# Patient Record
Sex: MALE | Race: WHITE
[De-identification: names, ages, dates, MRNs, and addresses within clinical notes are randomized per-mention and may not be internally consistent; named-entity substitution may affect disease eponyms.]

---

## 2019-01-14 ENCOUNTER — HOSPITAL ENCOUNTER (INPATIENT)
Dept: HOSPITAL 54 - ER | Age: 59
LOS: 3 days | Discharge: HOME | DRG: 773 | End: 2019-01-17
Attending: NURSE PRACTITIONER | Admitting: INTERNAL MEDICINE
Payer: MEDICAID

## 2019-01-14 VITALS — BODY MASS INDEX: 20.6 KG/M2 | WEIGHT: 147.19 LBS | HEIGHT: 71 IN

## 2019-01-14 DIAGNOSIS — F11.10: ICD-10-CM

## 2019-01-14 DIAGNOSIS — F10.129: Primary | ICD-10-CM

## 2019-01-14 DIAGNOSIS — K74.60: ICD-10-CM

## 2019-01-14 DIAGNOSIS — S30.0XXA: ICD-10-CM

## 2019-01-14 DIAGNOSIS — E87.70: ICD-10-CM

## 2019-01-14 DIAGNOSIS — S92.352A: ICD-10-CM

## 2019-01-14 DIAGNOSIS — S90.112A: ICD-10-CM

## 2019-01-14 DIAGNOSIS — B19.20: ICD-10-CM

## 2019-01-14 DIAGNOSIS — E44.1: ICD-10-CM

## 2019-01-14 DIAGNOSIS — E83.42: ICD-10-CM
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
## 2019-01-14 DIAGNOSIS — D69.59: ICD-10-CM

## 2019-01-14 DIAGNOSIS — S80.211A: ICD-10-CM

## 2019-01-14 DIAGNOSIS — E87.6: ICD-10-CM

## 2019-01-14 DIAGNOSIS — S80.212A: ICD-10-CM

## 2019-01-14 DIAGNOSIS — D61.818: ICD-10-CM

## 2019-01-14 DIAGNOSIS — E22.2: ICD-10-CM
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
## 2019-01-14 DIAGNOSIS — G89.29: ICD-10-CM

## 2019-01-14 DIAGNOSIS — J01.90: ICD-10-CM
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
## 2019-01-14 DIAGNOSIS — J44.1: ICD-10-CM

## 2019-01-14 LAB
ALBUMIN SERPL BCP-MCNC: 2.9 G/DL (ref 3.4–5)
ALP SERPL-CCNC: 113 U/L (ref 46–116)
ALT SERPL W P-5'-P-CCNC: 38 U/L (ref 12–78)
APAP SERPL-MCNC: 0 UG/ML (ref 10–30)
AST SERPL W P-5'-P-CCNC: 98 U/L (ref 15–37)
BASOPHILS # BLD AUTO: 0 /CMM (ref 0–0.2)
BASOPHILS NFR BLD AUTO: 0.3 % (ref 0–2)
BILIRUB DIRECT SERPL-MCNC: 0.5 MG/DL (ref 0–0.2)
BILIRUB SERPL-MCNC: 1 MG/DL (ref 0.2–1)
BUN SERPL-MCNC: 13 MG/DL (ref 7–18)
CALCIUM SERPL-MCNC: 8.8 MG/DL (ref 8.5–10.1)
CHLORIDE SERPL-SCNC: 94 MMOL/L (ref 98–107)
CO2 SERPL-SCNC: 27 MMOL/L (ref 21–32)
CREAT SERPL-MCNC: 0.8 MG/DL (ref 0.6–1.3)
EOSINOPHIL NFR BLD AUTO: 0.2 % (ref 0–6)
EOSINOPHIL NFR BLD MANUAL: 1 % (ref 0–4)
ETHANOL SERPL-MCNC: 337 MG/DL (ref 0–0)
GLUCOSE SERPL-MCNC: 115 MG/DL (ref 74–106)
HCT VFR BLD AUTO: 36 % (ref 39–51)
HGB BLD-MCNC: 12 G/DL (ref 13.5–17.5)
LYMPHOCYTES NFR BLD AUTO: 3.2 /CMM (ref 0.8–4.8)
LYMPHOCYTES NFR BLD AUTO: 34.2 % (ref 20–44)
LYMPHOCYTES NFR BLD MANUAL: 34 % (ref 16–48)
MCHC RBC AUTO-ENTMCNC: 33 G/DL (ref 31–36)
MCV RBC AUTO: 88 FL (ref 80–96)
MONOCYTES NFR BLD AUTO: 1.5 /CMM (ref 0.1–1.3)
MONOCYTES NFR BLD AUTO: 16 % (ref 2–12)
MONOCYTES NFR BLD MANUAL: 10 % (ref 0–11)
NEUTROPHILS # BLD AUTO: 4.6 /CMM (ref 1.8–8.9)
NEUTROPHILS NFR BLD AUTO: 49.3 % (ref 43–81)
NEUTS BAND NFR BLD MANUAL: 3 % (ref 0–5)
NEUTS SEG NFR BLD MANUAL: 52 % (ref 42–76)
PLATELET # BLD AUTO: 95 /CMM (ref 150–450)
POTASSIUM SERPL-SCNC: 2.9 MMOL/L (ref 3.5–5.1)
PROT SERPL-MCNC: 8.8 G/DL (ref 6.4–8.2)
RBC # BLD AUTO: 4.11 MIL/UL (ref 4.5–6)
SALICYLATES SERPL-MCNC: 0.4 MG/DL (ref 2.8–20)
SODIUM SERPL-SCNC: 132 MMOL/L (ref 136–145)
WBC NRBC COR # BLD AUTO: 9.3 K/UL (ref 4.3–11)

## 2019-01-14 PROCEDURE — G0378 HOSPITAL OBSERVATION PER HR: HCPCS

## 2019-01-14 PROCEDURE — G0480 DRUG TEST DEF 1-7 CLASSES: HCPCS

## 2019-01-14 RX ADMIN — POTASSIUM CHLORIDE SCH MLS/HR: 200 INJECTION, SOLUTION INTRAVENOUS at 14:30

## 2019-01-14 RX ADMIN — POTASSIUM CHLORIDE SCH MLS/HR: 200 INJECTION, SOLUTION INTRAVENOUS at 13:35

## 2019-01-14 RX ADMIN — DEXTROSE AND SODIUM CHLORIDE PRN MLS/HR: 5; 450 INJECTION, SOLUTION INTRAVENOUS at 23:50

## 2019-01-14 NOTE — NUR
Patient is resting comfortably in bed with eyes closed. Easily aroused. VSS.  
GAVE WATER AND EXTRA BLANKET FOR COMFORT.

## 2019-01-14 NOTE — NUR
TELE RN ADMISSION NOTES



PATIENT CAME TO UNIT VIA [facility], ALERT, ORIENTED X 3. ON O2 AT 2LPM VIA NC. SOB ON EXERTION. 
PATIENT STATED HE FELL ON THE HOTEL ROOM BUT CAN'T REMEMBER THE DETAILS. PATIENT ADMITS TO 
SMOKING 1 PACK PER DAY, DRINKS VODKA ON A REGULAR BASIS AND USES HEROIN, WHICH HE LAST USED 
2DAYS AGO. PATIENT IS FROM [place] AND HE LIVES ALONE. HE HAD A FLU SHOT 3 WEEKS AGO. 
ON TELE MONITOR- SINUS TACHY 110. SKIN ASSESSMENT DONE, PICTURES IN CHART. ORIENTED TO CALL 
[place], PLACED WITHIN EASY REACH. BED IN LOW, LOCKED POSITION. PADDING ON SIDE RAILS ON FOR 
SEIZURE PRECAUTIONS. WILL CONTINUE TO MONITOR ACCORDINGLY

## 2019-01-14 NOTE — NUR
BIB [facility], FROM A HOTEL D/T ETOH, TAKES HEROIN, ALCOHOLIC, FOUND BEER BOTTLE ON 
SITE.  STATES HE FELL OUT OF BED, BUT DOESN'T REMEMBER WHAT HAPPENED.  PT IS 
AOX4, AMB, VSS, RR EVEN AND UNLABORED.  HAS BRUISING ON HIS LEFT BIG TOE AND 
SECOND TOE R/T THE FALL.  NO ACUTE DISTRESS NOTED, NO OTHER COMPLAINTS AT THIS 
TIME.  READY FOR EVAL.

## 2019-01-14 NOTE — NUR
PT WAS PENDING DISCHARGE, HOWEVER HIS CONDITION APPEARED TO DECLINE.  
DIFFICULTY BREATHING AND WALKING.  MD NOTIFIED.  PT WILL BE ADMITTED.

## 2019-01-14 NOTE — NUR
Pt is assigned to [facility] rm#: [id number], DX: COPD exacerbation, and accepting MD: Dr Omid Seymour.

## 2019-01-15 VITALS — DIASTOLIC BLOOD PRESSURE: 76 MMHG | SYSTOLIC BLOOD PRESSURE: 144 MMHG

## 2019-01-15 VITALS — DIASTOLIC BLOOD PRESSURE: 92 MMHG | SYSTOLIC BLOOD PRESSURE: 160 MMHG

## 2019-01-15 VITALS — SYSTOLIC BLOOD PRESSURE: 144 MMHG | DIASTOLIC BLOOD PRESSURE: 95 MMHG

## 2019-01-15 VITALS — DIASTOLIC BLOOD PRESSURE: 88 MMHG | SYSTOLIC BLOOD PRESSURE: 152 MMHG

## 2019-01-15 VITALS — DIASTOLIC BLOOD PRESSURE: 96 MMHG | SYSTOLIC BLOOD PRESSURE: 144 MMHG

## 2019-01-15 LAB
APPEARANCE UR: CLEAR
BASOPHILS # BLD AUTO: 0 /CMM (ref 0–0.2)
BASOPHILS NFR BLD AUTO: 0.1 % (ref 0–2)
BILIRUB UR QL STRIP: NEGATIVE
BUN SERPL-MCNC: 10 MG/DL (ref 7–18)
CALCIUM SERPL-MCNC: 8.1 MG/DL (ref 8.5–10.1)
CHLORIDE SERPL-SCNC: 91 MMOL/L (ref 98–107)
CHOLEST SERPL-MCNC: 118 MG/DL (ref ?–200)
CO2 SERPL-SCNC: 25 MMOL/L (ref 21–32)
COLOR UR: (no result)
CREAT SERPL-MCNC: 0.6 MG/DL (ref 0.6–1.3)
EOSINOPHIL NFR BLD AUTO: 0 % (ref 0–6)
GLUCOSE SERPL-MCNC: 199 MG/DL (ref 74–106)
GLUCOSE UR STRIP-MCNC: NEGATIVE MG/DL
HCT VFR BLD AUTO: 31 % (ref 39–51)
HDLC SERPL-MCNC: 27 MG/DL (ref 40–60)
HGB BLD-MCNC: 10.1 G/DL (ref 13.5–17.5)
HGB UR QL STRIP: (no result) ERY/UL
KETONES UR STRIP-MCNC: NEGATIVE MG/DL
LDLC SERPL DIRECT ASSAY-MCNC: 92 MG/DL (ref 0–99)
LEUKOCYTE ESTERASE UR QL STRIP: NEGATIVE
LYMPHOCYTES NFR BLD AUTO: 0.4 /CMM (ref 0.8–4.8)
LYMPHOCYTES NFR BLD AUTO: 6.5 % (ref 20–44)
LYMPHOCYTES NFR BLD MANUAL: 16 % (ref 16–48)
MAGNESIUM SERPL-MCNC: 1 MG/DL (ref 1.8–2.4)
MCHC RBC AUTO-ENTMCNC: 33 G/DL (ref 31–36)
MCV RBC AUTO: 88 FL (ref 80–96)
MONOCYTES NFR BLD AUTO: 0.4 /CMM (ref 0.1–1.3)
MONOCYTES NFR BLD AUTO: 5.5 % (ref 2–12)
MONOCYTES NFR BLD MANUAL: 3 % (ref 0–11)
NEUTROPHILS # BLD AUTO: 5.7 /CMM (ref 1.8–8.9)
NEUTROPHILS NFR BLD AUTO: 87.9 % (ref 43–81)
NEUTS BAND NFR BLD MANUAL: 1 % (ref 0–5)
NEUTS SEG NFR BLD MANUAL: 80 % (ref 42–76)
NITRITE UR QL STRIP: NEGATIVE
OSMOLALITY UR: 599 MOS/KG (ref 340–1090)
PH UR STRIP: 8 [PH] (ref 5–8)
PHOSPHATE SERPL-MCNC: 2.8 MG/DL (ref 2.5–4.9)
PLATELET # BLD AUTO: 72 /CMM (ref 150–450)
POTASSIUM SERPL-SCNC: 3.4 MMOL/L (ref 3.5–5.1)
PROT UR QL STRIP: NEGATIVE MG/DL
RBC # BLD AUTO: 3.49 MIL/UL (ref 4.5–6)
RBC #/AREA URNS HPF: (no result) /HPF (ref 0–2)
SODIUM SERPL-SCNC: 128 MMOL/L (ref 136–145)
SODIUM UR-SCNC: 142 MMOL/L (ref 40–220)
TRIGL SERPL-MCNC: 44 MG/DL (ref 30–150)
TSH SERPL DL<=0.005 MIU/L-ACNC: 0.55 UIU/ML (ref 0.36–3.74)
UROBILINOGEN UR STRIP-MCNC: 4 EU/DL
WBC #/AREA URNS HPF: (no result) /HPF (ref 0–3)
WBC NRBC COR # BLD AUTO: 6.5 K/UL (ref 4.3–11)

## 2019-01-15 RX ADMIN — MAGNESIUM SULFATE IN DEXTROSE SCH MLS/HR: 10 INJECTION, SOLUTION INTRAVENOUS at 09:39

## 2019-01-15 RX ADMIN — MAGNESIUM SULFATE IN DEXTROSE SCH MLS/HR: 10 INJECTION, SOLUTION INTRAVENOUS at 10:40

## 2019-01-15 RX ADMIN — LORAZEPAM PRN MG: 2 INJECTION INTRAMUSCULAR; INTRAVENOUS at 01:21

## 2019-01-15 RX ADMIN — ALUMINUM HYDROXIDE, MAGNESIUM HYDROXIDE, AND SIMETHICONE PRN ML: 200; 200; 20 SUSPENSION ORAL at 21:41

## 2019-01-15 RX ADMIN — Medication PRN EACH: at 21:38

## 2019-01-15 RX ADMIN — MAGNESIUM SULFATE IN DEXTROSE SCH MLS/HR: 10 INJECTION, SOLUTION INTRAVENOUS at 12:40

## 2019-01-15 RX ADMIN — Medication SCH GM: at 16:03

## 2019-01-15 RX ADMIN — DEXTROSE MONOHYDRATE PRN MG: 50 INJECTION, SOLUTION INTRAVENOUS at 03:15

## 2019-01-15 RX ADMIN — MAGNESIUM SULFATE IN DEXTROSE SCH MLS/HR: 10 INJECTION, SOLUTION INTRAVENOUS at 11:41

## 2019-01-15 RX ADMIN — Medication PRN EACH: at 08:37

## 2019-01-15 RX ADMIN — Medication SCH MG: at 08:35

## 2019-01-15 RX ADMIN — DEXTROSE MONOHYDRATE PRN MG: 50 INJECTION, SOLUTION INTRAVENOUS at 23:06

## 2019-01-15 RX ADMIN — LORAZEPAM PRN MG: 2 INJECTION INTRAMUSCULAR; INTRAVENOUS at 16:45

## 2019-01-15 RX ADMIN — LEVOFLOXACIN SCH MG: 750 TABLET, FILM COATED ORAL at 12:36

## 2019-01-15 RX ADMIN — FOLIC ACID SCH MG: 1 TABLET ORAL at 08:35

## 2019-01-15 RX ADMIN — Medication PRN EACH: at 00:00

## 2019-01-15 RX ADMIN — DEXTROSE AND SODIUM CHLORIDE PRN MLS/HR: 5; 450 INJECTION, SOLUTION INTRAVENOUS at 08:58

## 2019-01-15 NOTE — NUR
RN NOTES



NO CALL BACK FROM DR. FIELDS. CRITICAL VALUE OF MAGNESIUM 1.0 ENDORSED TO MORNING NURSE.

## 2019-01-15 NOTE — NUR
RN NOTE



PATIENT SEEN BY DR. FIELDS, ORDERED ATIVAN 1MG IV Q4H PRN FOR ANXIETY AND AGITATION. ORDERS 
NOTED AND CARRIED OUT.

## 2019-01-15 NOTE — NUR
m/s lvn: md visit



[facility] and [facility] with verbal orders to do stat pelvic x-ray and 
stat left foot complete x-ray to r/o fx. orders read back and carried out and acknowledged.

## 2019-01-15 NOTE — NUR
m/s lvn: notes



pt refused p.t. eval, stated, "no, i don't feel good." instructed to call for assistance.

## 2019-01-15 NOTE — NUR
tele lvn: initial assessment



received pt in bed awake, a/ox3. no seizure noted at this time. continue on ivf, infusing 
well. instructed to call for assistance. will continue to monitor.

## 2019-01-15 NOTE — NUR
TELE RN CLOSING NOTES



PATIENT RESTING IN BED, ALERT AND ORIENTED X 3. BREATHING EVEN AND UNLABORED. NO COMPLAINTS 
OF PAIN OR DISCOMFORT AS OF THIS TIME. ON O2 VIA NC AT 2LPM. PERIPHERAL IV ON RIGHT HAND 
INFUSING AT 100ML/HR . TELE MONITOR IN PLACE, SINUS RHYTHM-TACHY  WITH PVCs, WITH 
BIGEMINY. DVT PUMP ON IN PLACE. ALL NEEDS ATTENDED TO. ALL DUE MEDICATIONS GIVEN AS ORDERED. 
CALL BELL WITHIN REACH. BED IN LOW, LOCKED POSITION. WILL ENDORSE WADE TO ONCOMING RN.

## 2019-01-15 NOTE — NUR
m/s lvn: notes



noted with shakiness, ativan 1mg ivp given by rn. instructed to call for assistance.

## 2019-01-15 NOTE — NUR
m/s lvn: ortho consult



seen and examined by linda becerra (p.a.). linda spoke to cindy re: 5th metatarsal fx and 
plan of care. pt aware. pt in Non-weight bearing to lle, pt verbalized understanding. 
instructed to call for assistance. will continue to monitor.

## 2019-01-15 NOTE — NUR
RN NOTE



PATIENT C/O NAUSEA AND VOMITING X1- COFFEE COLORED VOMITUS, ABOUT 100ML. ZOFRAN GIVEN AS 
ORDERED. CHARGE NURSE MADE AWARE. WILL MONITOR CLOSELY

## 2019-01-15 NOTE — NUR
visited and interviewed patient, states lives on a boat 

in [place]. He is visiting his sister Leana Carbone in [place] and came 

to  a truck. Patient's sister Leana Carbone is his emergency 

contact [phone]. He states he drinks a pint of vodka per day and 

has drank most of his life. Patient is also a heroin user and last used 4 or 5 days ago. 
Patient also states he has used heroin most of his life.  offered alcohol 
treatment program referrals, however, he declined. He denies any psychiatric history or 
diagnosis. Patient does not have an Advance Directive at this time. Pt. would like to be 
discharged back to his sister Leana once medically cleared. He states his sister Leana 
will pick him up upon discharge. 

-------------------------------------------------------------------------------

Addendum: 01/15/19 at 1813 by JUNIOR HAWKINS RN

-------------------------------------------------------------------------------

Amended: Links added.

## 2019-01-15 NOTE — NUR
WOUND CARE CONSULT: PT PRESENTS WITH MULTIPLE BRUISES AND DRY ABRASIONS WITH ABRASION TO 
SACRAL AREA, PRESENT ON ADMISSION. LEFT GREAT TOE AND 2ND TOE VERY BRUISED. RECOMMENDATIONS 
MADE FOR WOUND CARE AND SKIN PROTECTION. DISCUSSED WITH NURSING STAFF. DEFER TO M D FOR LEFT 
FOOT. WILL SEE PRN. MD IN AGREEMENT WITH PLAN OF CARE. PT ON [facility] LOW AIRLOSS 
BED. 

-------------------------------------------------------------------------------

Addendum: 01/15/19 at 1121 by YO CUEVA WNDNU

-------------------------------------------------------------------------------

Amended: Links added.

## 2019-01-15 NOTE — NUR
RN INITIAL NOTES



PATIENT RECEIVED IN BED, WATCHING TV, ALERT, ORIENTED X3. BREATHING EVEN AND UNLABORED. NOT 
IN ANY DISTRESS. NO COMPLAINTS AS OF THIS TIME. PERIPHERAL IV ON RIGHT HAND G#20 INFUSING AT 
100ML/HR. CALL BELL WITHIN REACH. BED IN LOW, LOCKED POSITION. WILL CONTINUE TO MONITOR 
ACCORDINGLY

## 2019-01-15 NOTE — NUR
m/s lvn: notes



respiratory culture to left nares collected due to <Left sinusitis on CT; reports snorting 
heroin left nares per md and sent to lab, spoke to evie.

## 2019-01-15 NOTE — NUR
Social service consult requested by Dr. Anne for ETOH and heroin abuse and possible homeless. 
Pt. is a 58 year old male who was admitted to [facility] for COPD exacerbation. SW met with pt. 
bedside. Pt. is alert and oriented x 4. Pt. has tattoos on his chest and arms. Pt. appears 
disheveled. Pt. states he lives on a boat in [place] [facility]. Pt. is visiting his sister Leana Carbone in [place] and came to  a truck. Pt's sister Leana Carbone is his emergency 
contact [phone]. Pt. states he drinks a pint of vodka per day and has drank most of 
his life. Pt. is also a heroin user and last used 4 or 5 days ago. Pt. also states he has 
used heroin most of his life. SW offered pt. alcohol treatment program referrals, however, 
pt. declined. Pt. denies any psychiatric history or diagnosis. Pt. does not have an Advance 
Directive at this time. Pt. would like to be discharged back to his sister Leana once 
medically cleared. Pt. states his sister Leana will pick him up upon discharge. 



No other social service needs are requested at this time. SW is available, if needed. LISBETH 
updated  Clarisa Hector regarding pt's discharge plan and pt. not being homeless.

## 2019-01-16 VITALS — SYSTOLIC BLOOD PRESSURE: 137 MMHG | DIASTOLIC BLOOD PRESSURE: 88 MMHG

## 2019-01-16 VITALS — DIASTOLIC BLOOD PRESSURE: 72 MMHG | SYSTOLIC BLOOD PRESSURE: 122 MMHG

## 2019-01-16 VITALS — SYSTOLIC BLOOD PRESSURE: 122 MMHG | DIASTOLIC BLOOD PRESSURE: 75 MMHG

## 2019-01-16 LAB
BASOPHILS # BLD AUTO: 0 /CMM (ref 0–0.2)
BASOPHILS NFR BLD AUTO: 0 % (ref 0–2)
BUN SERPL-MCNC: 15 MG/DL (ref 7–18)
CALCIUM SERPL-MCNC: 8.3 MG/DL (ref 8.5–10.1)
CHLORIDE SERPL-SCNC: 89 MMOL/L (ref 98–107)
CO2 SERPL-SCNC: 22 MMOL/L (ref 21–32)
CREAT SERPL-MCNC: 0.5 MG/DL (ref 0.6–1.3)
EOSINOPHIL NFR BLD AUTO: 0 % (ref 0–6)
GLUCOSE SERPL-MCNC: 158 MG/DL (ref 74–106)
HCT VFR BLD AUTO: 31 % (ref 39–51)
HGB BLD-MCNC: 10.2 G/DL (ref 13.5–17.5)
LYMPHOCYTES NFR BLD AUTO: 0.7 /CMM (ref 0.8–4.8)
LYMPHOCYTES NFR BLD AUTO: 5.1 % (ref 20–44)
LYMPHOCYTES NFR BLD MANUAL: 5 % (ref 16–48)
MAGNESIUM SERPL-MCNC: 1.5 MG/DL (ref 1.8–2.4)
MCHC RBC AUTO-ENTMCNC: 33 G/DL (ref 31–36)
MCV RBC AUTO: 89 FL (ref 80–96)
MONOCYTES NFR BLD AUTO: 1.1 /CMM (ref 0.1–1.3)
MONOCYTES NFR BLD AUTO: 8 % (ref 2–12)
MONOCYTES NFR BLD MANUAL: 2 % (ref 0–11)
NEUTROPHILS # BLD AUTO: 11.5 /CMM (ref 1.8–8.9)
NEUTROPHILS NFR BLD AUTO: 86.9 % (ref 43–81)
NEUTS BAND NFR BLD MANUAL: 2 % (ref 0–5)
NEUTS SEG NFR BLD MANUAL: 91 % (ref 42–76)
PHOSPHATE SERPL-MCNC: 2 MG/DL (ref 2.5–4.9)
PLATELET # BLD AUTO: 85 /CMM (ref 150–450)
POTASSIUM SERPL-SCNC: 3.7 MMOL/L (ref 3.5–5.1)
RBC # BLD AUTO: 3.5 MIL/UL (ref 4.5–6)
SODIUM SERPL-SCNC: 123 MMOL/L (ref 136–145)
URATE SERPL-MCNC: 2.6 MG/DL (ref 2.6–7.2)
WBC NRBC COR # BLD AUTO: 13.3 K/UL (ref 4.3–11)

## 2019-01-16 RX ADMIN — Medication PRN EACH: at 10:07

## 2019-01-16 RX ADMIN — Medication SCH MG: at 09:42

## 2019-01-16 RX ADMIN — MUPIROCIN SCH GM: 20 OINTMENT TOPICAL at 16:49

## 2019-01-16 RX ADMIN — MUPIROCIN SCH GM: 20 OINTMENT TOPICAL at 21:38

## 2019-01-16 RX ADMIN — MAGNESIUM SULFATE IN DEXTROSE SCH MLS/HR: 10 INJECTION, SOLUTION INTRAVENOUS at 10:54

## 2019-01-16 RX ADMIN — Medication SCH GM: at 09:45

## 2019-01-16 RX ADMIN — LORAZEPAM PRN MG: 2 INJECTION INTRAMUSCULAR; INTRAVENOUS at 00:45

## 2019-01-16 RX ADMIN — DEXTROSE MONOHYDRATE PRN MG: 50 INJECTION, SOLUTION INTRAVENOUS at 05:42

## 2019-01-16 RX ADMIN — MAGNESIUM SULFATE IN DEXTROSE SCH MLS/HR: 10 INJECTION, SOLUTION INTRAVENOUS at 12:21

## 2019-01-16 RX ADMIN — FOLIC ACID SCH MG: 1 TABLET ORAL at 09:42

## 2019-01-16 RX ADMIN — LEVOFLOXACIN SCH MG: 750 TABLET, FILM COATED ORAL at 09:42

## 2019-01-16 NOTE — NUR
notified by lab that pt. positive for mrsa nares-charge rn aware and put in 
isolation.bactroban ordered.

## 2019-01-16 NOTE — NUR
RN NOTES



PATIENT COMPLAINING OF NAUSEA AND VOMITING X 1. ZOFRAN GIVEN AS ORDERED. WILL MONITOR 
CLOSELY

## 2019-01-16 NOTE — NUR
MS RN CLOSING NOTES



PATIENT RESTING IN BED, WATCHING TV, ALERT AND ORIENTED X 3. BREATHING EVEN AND UNLABORED. 
NO COMPLAINTS OF PAIN AS OF THIS TIME. IV SITE ON RIGHT HAND INTACT AND PATENT, S/L. ALL 
NEEDS ATTENDED TO. ALL DUE MEDICATIONS GIVEN AS ORDERED. CALL BELL WITHIN REACH. BED IN LOW, 
LOCKED POSITION. WILL ENDORSE WADE TO ONCOMING RN.

## 2019-01-16 NOTE — NUR
phosphorous and mg levels low-replacements given.pt. as well on fluid restriction as na 
level low.dr. gonzalez in to see pt.

## 2019-01-16 NOTE — NUR
MS RN NOTES



RECEIVED PATIENT  IN BED, WATCHING TV, ALERT, ORIENTED X3. BREATHING EVEN AND UNLABORED. NOT 
IN ANY DISTRESS. NO COMPLAINTS OF DISCOMFORT AS OF THIS TIME. PERIPHERAL IV ON RIGHT HAND 
G#20 INTACT AND PATENT. PATIENT ON FLUID RESTRICTION  ML / 24 HOUR, INFORMED PATIENT, 
CALL BELL WITHIN REACH. BED IN LOW, LOCKED POSITION. WILL CONTINUE TO MONITOR ACCORDINGLY

## 2019-01-16 NOTE — NUR
RN MS NOTES

PATIENT COMPLAINT OF FEELING ANXIOUS AND ANXIETY REQUESTING FOR ATIVAN 

PRN ATIVAN 0.5ML GIVEN AS ORDERED.

 REST WASTED WITH ANOTHER RN. 

-------------------------------------------------------------------------------

Addendum: 01/16/19 at 0049 by ABENA BENTLEY RN

-------------------------------------------------------------------------------

0.5ML WASTED WITH ANOTHER RN

## 2019-01-17 VITALS — DIASTOLIC BLOOD PRESSURE: 54 MMHG | SYSTOLIC BLOOD PRESSURE: 145 MMHG

## 2019-01-17 LAB
BUN SERPL-MCNC: 22 MG/DL (ref 7–18)
CALCIUM SERPL-MCNC: 8.1 MG/DL (ref 8.5–10.1)
CHLORIDE SERPL-SCNC: 92 MMOL/L (ref 98–107)
CO2 SERPL-SCNC: 24 MMOL/L (ref 21–32)
CREAT SERPL-MCNC: 0.7 MG/DL (ref 0.6–1.3)
GLUCOSE SERPL-MCNC: 139 MG/DL (ref 74–106)
MAGNESIUM SERPL-MCNC: 1.6 MG/DL (ref 1.8–2.4)
PHOSPHATE SERPL-MCNC: 3 MG/DL (ref 2.5–4.9)
POTASSIUM SERPL-SCNC: 3.6 MMOL/L (ref 3.5–5.1)
SODIUM SERPL-SCNC: 127 MMOL/L (ref 136–145)

## 2019-01-17 RX ADMIN — Medication SCH MG: at 08:30

## 2019-01-17 RX ADMIN — Medication SCH APPLIC: at 08:39

## 2019-01-17 RX ADMIN — Medication PRN EACH: at 00:34

## 2019-01-17 RX ADMIN — FOLIC ACID SCH MG: 1 TABLET ORAL at 08:30

## 2019-01-17 RX ADMIN — MUPIROCIN SCH APPLIC: 20 OINTMENT TOPICAL at 08:40

## 2019-01-17 RX ADMIN — MAGNESIUM SULFATE IN DEXTROSE SCH MLS/HR: 10 INJECTION, SOLUTION INTRAVENOUS at 11:25

## 2019-01-17 RX ADMIN — ALUMINUM HYDROXIDE, MAGNESIUM HYDROXIDE, AND SIMETHICONE PRN ML: 200; 200; 20 SUSPENSION ORAL at 08:38

## 2019-01-17 RX ADMIN — MAGNESIUM SULFATE IN DEXTROSE SCH MLS/HR: 10 INJECTION, SOLUTION INTRAVENOUS at 12:35

## 2019-01-17 RX ADMIN — LEVOFLOXACIN SCH MG: 750 TABLET, FILM COATED ORAL at 11:17

## 2019-01-17 RX ADMIN — Medication PRN EACH: at 08:30

## 2019-01-17 NOTE — NUR
MS RN OPENING NOTES



RECEIVED PT LAYING IN BED WITH HOB ELEVATED. PT IS A/O X4, AFEBRILE. RESPIRATIONS ARE EVEN 
AND UNLABORED, NOT IN ANY ACUTE DISTRESS NOTED. PT C/O PAIN TO LEFT FOOT AND BACK. NO C/O 
SOB, N/V. PUPILS ARE REACTIVE TO LIGHT, BILATERAL HAND  ARE STRONG AND EQUAL. IV SITE 
TO RIGHT HAND INTACT, NO INFILTRATION NOTED. DRESSING KEPT CLEAN AND DRY. SAFETY MEASURES 
ARE IN PLACE. INSTRUCTED PT TO USE CALL LIGHT WHEN ASSISTANCE IS NEEDED, CALL LIGHT IS LEFT 
WITHIN REACH. WILL CONTINUE TO MONITOR THROUGHOUT SHIFT FOR CONTINUITY OF CARE.

## 2019-01-17 NOTE — NUR
MS RN DISCHARGE NOTE



PT DISCHARGE TO HOME IN STABLE CONDITION WITH SISTER MARIANN VIA PERSONAL VEHICLE. PT IS A/O 
X4, AFEBRILE. RESPIRATIONS ARE EVEN AND UNLABORED, NOT IN ANY ACUTE DISTRESS NOTED. PT 
DENIES ANY PAIN AT THIS TIME. NO C/O SOB, N/V. PICTURES TAKEN OF SKIN AND PLACED IN CHART. 
IV SITE REMOVED, APPLIED PRESSURE AND TOLERATED WELL. ID BANDS REMOVED. ALL BELONGINGS, 
DISCHARGE PAPERWORK INCLUDING SCRIPT HAS BEEN HANDED TO PT. PT ACCOMPANIED BY RN AND PT FOR 
SAFETY TRANSFER TO VEHICLE. PT LEFT IN STABLE CONDITION.

## 2019-01-17 NOTE — NUR
MS RN NOTES-- EXPLAINED DISCHARGE PAPERWORK TO PT WITH VERBAL AND WRITTEN UNDERSTANDING. 
PICTURES TAKEN OF SKIN AND PLACED IN CHART. ALL BELONGINGS ACCOUNTED FOR AND READY IN A BAG 
TO TAKE HOME.

## 2019-01-17 NOTE — NUR
MS RN NOTES



 PATIENT  IN BED, WATCHING TV, ALERT, ORIENTED X3. BREATHING EVEN AND UNLABORED. NOT IN ANY 
DISTRESS. NO COMPLAINTS OF DISCOMFORT AS OF THIS TIME. PERIPHERAL IV ON RIGHT HAND G#20 
INTACT AND PATENT. PATIENT ON FLUID RESTRICTION  ML / 24 HOUR, INFORMED PATIENT, CALL 
BELL WITHIN REACH. BED IN LOW, LOCKED POSITION. WILL ENDORSE TO AM NURSE FOR CONTINUITY OF 
CARE.